# Patient Record
Sex: MALE | Race: WHITE | Employment: FULL TIME | URBAN - METROPOLITAN AREA
[De-identification: names, ages, dates, MRNs, and addresses within clinical notes are randomized per-mention and may not be internally consistent; named-entity substitution may affect disease eponyms.]

---

## 2017-06-09 ENCOUNTER — OFFICE VISIT (OUTPATIENT)
Dept: INTERNAL MEDICINE CLINIC | Age: 32
End: 2017-06-09
Payer: COMMERCIAL

## 2017-06-09 VITALS
BODY MASS INDEX: 22.73 KG/M2 | WEIGHT: 150 LBS | SYSTOLIC BLOOD PRESSURE: 118 MMHG | HEART RATE: 88 BPM | HEIGHT: 68 IN | DIASTOLIC BLOOD PRESSURE: 70 MMHG

## 2017-06-09 DIAGNOSIS — Z13.9 SCREENING: ICD-10-CM

## 2017-06-09 DIAGNOSIS — K64.9 HEMORRHOIDS, UNSPECIFIED HEMORRHOID TYPE: Primary | ICD-10-CM

## 2017-06-09 DIAGNOSIS — F12.10 MARIJUANA ABUSE: ICD-10-CM

## 2017-06-09 DIAGNOSIS — G43.909 MIGRAINE WITHOUT STATUS MIGRAINOSUS, NOT INTRACTABLE, UNSPECIFIED MIGRAINE TYPE: ICD-10-CM

## 2017-06-09 PROCEDURE — 99203 OFFICE O/P NEW LOW 30 MIN: CPT | Performed by: INTERNAL MEDICINE

## 2017-06-09 RX ORDER — DOCUSATE SODIUM 100 MG/1
100 CAPSULE, LIQUID FILLED ORAL NIGHTLY PRN
Qty: 30 CAPSULE | Refills: 2 | Status: SHIPPED | OUTPATIENT
Start: 2017-06-09

## 2017-06-09 ASSESSMENT — PATIENT HEALTH QUESTIONNAIRE - PHQ9
SUM OF ALL RESPONSES TO PHQ QUESTIONS 1-9: 0
1. LITTLE INTEREST OR PLEASURE IN DOING THINGS: 0
2. FEELING DOWN, DEPRESSED OR HOPELESS: 0
SUM OF ALL RESPONSES TO PHQ9 QUESTIONS 1 & 2: 0

## 2017-06-09 ASSESSMENT — ENCOUNTER SYMPTOMS
NAUSEA: 0
BACK PAIN: 0
DIARRHEA: 0
ABDOMINAL PAIN: 1
EYE PAIN: 0
EYE REDNESS: 0
CHOKING: 0
APNEA: 0
SHORTNESS OF BREATH: 0
ANAL BLEEDING: 1
CONSTIPATION: 1
BLOOD IN STOOL: 1
ABDOMINAL DISTENTION: 0
CHEST TIGHTNESS: 0
COUGH: 0
EYE DISCHARGE: 0
EYE ITCHING: 0

## 2017-06-14 ENCOUNTER — TELEPHONE (OUTPATIENT)
Dept: INTERNAL MEDICINE CLINIC | Age: 32
End: 2017-06-14

## 2025-04-28 ENCOUNTER — OFFICE VISIT (OUTPATIENT)
Age: 40
End: 2025-04-28

## 2025-04-28 VITALS
HEIGHT: 66 IN | TEMPERATURE: 98.8 F | OXYGEN SATURATION: 98 % | WEIGHT: 152 LBS | RESPIRATION RATE: 19 BRPM | HEART RATE: 78 BPM | SYSTOLIC BLOOD PRESSURE: 108 MMHG | BODY MASS INDEX: 24.43 KG/M2 | DIASTOLIC BLOOD PRESSURE: 73 MMHG

## 2025-04-28 DIAGNOSIS — R11.2 NAUSEA AND VOMITING, UNSPECIFIED VOMITING TYPE: ICD-10-CM

## 2025-04-28 DIAGNOSIS — K52.9 GASTROENTERITIS: Primary | ICD-10-CM

## 2025-04-28 DIAGNOSIS — R19.7 DIARRHEA, UNSPECIFIED TYPE: ICD-10-CM

## 2025-04-28 ASSESSMENT — ENCOUNTER SYMPTOMS
COUGH: 0
DIARRHEA: 0
SORE THROAT: 0
NAUSEA: 0
VOMITING: 0
CONSTIPATION: 0
SHORTNESS OF BREATH: 0

## 2025-04-28 NOTE — PROGRESS NOTES
[facility], [facility] ([facility])  [facility] [place] [address]  Dept: [phone]    Date: 25    Galo Mast (:  1985) is a 39 y.o. male, here for evaluation of the following chief complaint(s):  Nausea and Jaw Pain (X3d/)      HPI  39 y.o. male presents with nausea, vomiting, and diarrhea this morning that has resolved. He needs a work note. He also has a toothache on the left upper side. He completed Augmentin for a dental abscess which is healing.      History provided by:  Patient       ROS  Review of Systems   Constitutional:  Negative for chills, diaphoresis, fatigue and fever.   HENT:  Negative for sore throat.    Respiratory:  Negative for cough and shortness of breath.    Cardiovascular:  Negative for chest pain and palpitations.   Gastrointestinal:  Negative for constipation, diarrhea, nausea and vomiting.       PHYSICAL EXAM  Vitals:    25 1854   BP: 108/73   Pulse: 78   Resp: 19   Temp: 98.8 °F (37.1 °C)   TempSrc: Oral   SpO2: 98%   Weight: 68.9 kg (152 lb)   Height: 1.676 m (5' 6\")     Physical Exam  Constitutional:       Appearance: Normal appearance.   Eyes:      Conjunctiva/sclera: Conjunctivae normal.   Cardiovascular:      Rate and Rhythm: Normal rate.      Pulses: Normal pulses.      Heart sounds: Normal heart sounds.   Pulmonary:      Effort: Pulmonary effort is normal.      Breath sounds: Normal breath sounds.   Abdominal:      General: Abdomen is flat. Bowel sounds are normal.      Palpations: Abdomen is soft.      Tenderness: There is no abdominal tenderness. There is no right CVA tenderness, left CVA tenderness, guarding or rebound.   Musculoskeletal:      Cervical back: No rigidity or tenderness.   Lymphadenopathy:      Cervical: No cervical adenopathy.   Skin:     General: Skin is warm and dry.   Neurological:      Mental Status: He is alert and oriented to person, place, and time.   Psychiatric:         Mood and

## 2025-05-08 ENCOUNTER — OFFICE VISIT (OUTPATIENT)
Age: 40
End: 2025-05-08

## 2025-05-08 VITALS
RESPIRATION RATE: 16 BRPM | HEIGHT: 68 IN | DIASTOLIC BLOOD PRESSURE: 78 MMHG | OXYGEN SATURATION: 98 % | HEART RATE: 78 BPM | TEMPERATURE: 98.7 F | SYSTOLIC BLOOD PRESSURE: 125 MMHG | BODY MASS INDEX: 22.73 KG/M2 | WEIGHT: 150 LBS

## 2025-05-08 DIAGNOSIS — K64.8 HEMORRHOIDS, INTERNAL, WITH BLEEDING: ICD-10-CM

## 2025-05-08 DIAGNOSIS — R19.7 DIARRHEA, UNSPECIFIED TYPE: Primary | ICD-10-CM

## 2025-05-08 ASSESSMENT — ENCOUNTER SYMPTOMS
ABDOMINAL PAIN: 1
ABDOMINAL DISTENTION: 0
COUGH: 0
BLOOD IN STOOL: 1
DIARRHEA: 1
NAUSEA: 0
SHORTNESS OF BREATH: 0
VOMITING: 0

## 2025-05-08 NOTE — PROGRESS NOTES
[facility], [facility] ([facility])  [facility] [place] [address]  Dept: [phone]    Date: 25    Galo Mast (:  1985) is a 39 y.o. male, here for evaluation of the following chief complaint(s):  Abdominal Pain (IBS flare x 1 day had to leave work due to medical )      HPI  39 y.o. male presents with several episodes of diarrhea earlier today and was sent home from work. Symptoms have resolved. Currently under the care od a colorectal surgeon and has follow-up appointments scheduled.      Abdominal Pain  Associated symptoms include diarrhea. Pertinent negatives include no fever, headaches, nausea or vomiting.        ROS  Review of Systems   Constitutional:  Negative for chills, diaphoresis, fatigue and fever.   Respiratory:  Negative for cough and shortness of breath.    Cardiovascular:  Negative for chest pain and palpitations.   Gastrointestinal:  Positive for abdominal pain, blood in stool and diarrhea. Negative for abdominal distention, nausea and vomiting.   Skin:  Negative for pallor.   Neurological:  Negative for dizziness, light-headedness and headaches.       PHYSICAL EXAM  Vitals:    25 1917 25 1926   BP: 134/84 125/78   BP Site: Right Upper Arm    Patient Position: Sitting    BP Cuff Size: Medium Adult    Pulse: 78    Resp: 16    Temp: 98.7 °F (37.1 °C)    TempSrc: Oral    SpO2: 98%    Weight: 68 kg (150 lb)    Height: 1.727 m (5' 8\")      Physical Exam      RESULTS  No results found for this visit on 25.     ASSESSMENT/PLAN:    ICD-10-CM    1. Diarrhea, unspecified type  R19.7       2. Hemorrhoids, internal, with bleeding  K64.8            Patient Instructions   If the condition worsens, go immediately to an ER.            Use otc tylenol as needed for pain.    If the condition worsens, go immediately to an ER.           Keep follow-up with the colorectal surdeon as scheduled.    Follow up in 7 days with PCP if symptoms

## 2025-05-13 ENCOUNTER — OFFICE VISIT (OUTPATIENT)
Age: 40
End: 2025-05-13

## 2025-05-13 VITALS
WEIGHT: 150 LBS | HEIGHT: 68 IN | OXYGEN SATURATION: 98 % | TEMPERATURE: 98.7 F | HEART RATE: 67 BPM | DIASTOLIC BLOOD PRESSURE: 78 MMHG | RESPIRATION RATE: 16 BRPM | BODY MASS INDEX: 22.73 KG/M2 | SYSTOLIC BLOOD PRESSURE: 110 MMHG

## 2025-05-13 DIAGNOSIS — S63.621A SPRAIN OF INTERPHALANGEAL JOINT OF RIGHT THUMB, INITIAL ENCOUNTER: Primary | ICD-10-CM

## 2025-05-13 DIAGNOSIS — M77.8 TENDONITIS OF FINGER: ICD-10-CM

## 2025-05-13 RX ORDER — IBUPROFEN 600 MG/1
600 TABLET, FILM COATED ORAL 3 TIMES DAILY PRN
Qty: 30 TABLET | Refills: 0 | Status: SHIPPED | OUTPATIENT
Start: 2025-05-13

## 2025-05-13 ASSESSMENT — ENCOUNTER SYMPTOMS
DIARRHEA: 0
FACIAL SWELLING: 0
VOICE CHANGE: 0
EYE REDNESS: 0
CONSTIPATION: 0
CHEST TIGHTNESS: 0
BACK PAIN: 0
TROUBLE SWALLOWING: 0
EYE DISCHARGE: 0
ABDOMINAL DISTENTION: 0
NAUSEA: 0
EYE PAIN: 0
COLOR CHANGE: 0
SHORTNESS OF BREATH: 0
VOMITING: 0
COUGH: 0
ABDOMINAL PAIN: 0
SORE THROAT: 0

## 2025-05-13 NOTE — PROGRESS NOTES
Chief complaint(s): Injury (Right thumb injury )      History of present illness :     Galo Mast  is a  very pleasant  39 y.o. male  presented to the urgent care today  for evaluation of   right thumb pain that has been ongoing the last 2 days.  Patient reports having persistent repetitive movement of his right thumb at work for the last week.  Yesterday his thumb got swollen and painful range of motion.  Denies any recent fall.   He has been trying over-the-counter pain medications with minimal relief of symptoms.  No neurovascular or motor changes in upper or lower extremities.  No rash.  No open wounds.  Denies any other complaints or needs at this time.     PAST MEDICAL HISTORY    Past Medical History:   Diagnosis Date    Headache(784.0)     Intestinal stoma prolapse (HCC)     Pericardial effusion        SURGICAL HISTORY    No past surgical history on file.    CURRENT MEDICATIONS    Current Outpatient Rx   Medication Sig Dispense Refill    ibuprofen (ADVIL;MOTRIN) 600 MG tablet Take 1 tablet by mouth 3 times daily as needed for Pain 30 tablet 0    hydrocortisone (ANUSOL-HC) 2.5 % rectal cream Place rectally 2 times daily. (Patient not taking: Reported on 5/8/2025) 1 Tube 1    docusate sodium (COLACE) 100 MG capsule Take 1 capsule by mouth nightly as needed for Constipation (Patient not taking: Reported on 5/8/2025) 30 capsule 2    ibuprofen (ADVIL;MOTRIN) 600 MG tablet Take 1 tablet by mouth every 6 hours as needed for Pain. Take with food. (Patient not taking: Reported on 5/8/2025) 15 tablet 0       ALLERGIES    No Known Allergies    FAMILY HISTORY    Family History   Problem Relation Age of Onset    No Known Problems Mother     Other Father         Skin Cancer    Heart Attack Sister     No Known Problems Brother     No Known Problems Brother     No Known Problems Brother     No Known Problems Brother     No Known Problems Brother        SOCIAL HISTORY    Social History     Socioeconomic History